# Patient Record
Sex: MALE | Race: WHITE
[De-identification: names, ages, dates, MRNs, and addresses within clinical notes are randomized per-mention and may not be internally consistent; named-entity substitution may affect disease eponyms.]

---

## 2019-11-20 ENCOUNTER — HOSPITAL ENCOUNTER (EMERGENCY)
Dept: HOSPITAL 8 - ED | Age: 34
Discharge: HOME | End: 2019-11-20
Payer: MEDICAID

## 2019-11-20 VITALS — SYSTOLIC BLOOD PRESSURE: 114 MMHG | DIASTOLIC BLOOD PRESSURE: 80 MMHG

## 2019-11-20 VITALS — HEIGHT: 74 IN | BODY MASS INDEX: 19.81 KG/M2 | WEIGHT: 154.32 LBS
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
## 2019-11-20 DIAGNOSIS — Z72.9: ICD-10-CM

## 2019-11-20 DIAGNOSIS — F10.220: Primary | ICD-10-CM

## 2019-11-20 DIAGNOSIS — Z59.0: ICD-10-CM

## 2019-11-20 PROCEDURE — 99283 EMERGENCY DEPT VISIT LOW MDM: CPT

## 2019-11-20 SDOH — ECONOMIC STABILITY - HOUSING INSECURITY: HOMELESSNESS: Z59.0

## 2019-11-20 NOTE — NUR
PT WALKING WITH STEADY GAIT. DISCHARGE PAPERS GIVEN. AMBULATED TO DISCHARGE 
WINDOW WITHOUT ASSISTANCE

## 2019-11-20 NOTE — NUR
PT AWAKE AND ASKING WHAT IS GOING ON. EXPLAINED TO HIM ONCE HE HAS SOBERED UP 
ENOUGH TO BE SAFE HE WILL BE DISCHARDED. ORDERED DINNER TRAY

## 2019-11-23 ENCOUNTER — HOSPITAL ENCOUNTER (EMERGENCY)
Dept: HOSPITAL 8 - ED | Age: 34
Discharge: HOME | End: 2019-11-23
Payer: MEDICAID

## 2019-11-23 VITALS — BODY MASS INDEX: 16.92 KG/M2 | WEIGHT: 143.3 LBS | HEIGHT: 77 IN

## 2019-11-23 VITALS — DIASTOLIC BLOOD PRESSURE: 88 MMHG | SYSTOLIC BLOOD PRESSURE: 142 MMHG
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
## 2019-11-23 DIAGNOSIS — F17.200: ICD-10-CM

## 2019-11-23 DIAGNOSIS — S01.511D: Primary | ICD-10-CM

## 2019-11-23 DIAGNOSIS — F12.20: ICD-10-CM

## 2019-11-23 DIAGNOSIS — Z72.9: ICD-10-CM

## 2019-11-23 PROCEDURE — 99283 EMERGENCY DEPT VISIT LOW MDM: CPT

## 2019-11-24 ENCOUNTER — HOSPITAL ENCOUNTER (EMERGENCY)
Dept: HOSPITAL 8 - ED | Age: 34
Discharge: HOME | End: 2019-11-24
Payer: MEDICAID

## 2019-11-24 VITALS — HEIGHT: 72 IN | BODY MASS INDEX: 20.31 KG/M2 | WEIGHT: 149.91 LBS

## 2019-11-24 VITALS — DIASTOLIC BLOOD PRESSURE: 89 MMHG | SYSTOLIC BLOOD PRESSURE: 132 MMHG

## 2019-11-24 DIAGNOSIS — F10.129: Primary | ICD-10-CM

## 2019-11-24 DIAGNOSIS — Z72.9: ICD-10-CM
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
## 2019-11-24 PROCEDURE — 99283 EMERGENCY DEPT VISIT LOW MDM: CPT

## 2019-11-24 NOTE — NUR
PT REFUSING TO LEAVE AFER BEING GIVEN DC PAPERWORK.  PT SCREAMING AND CURSING.  
SECURITY CALLED.  SECURITY AT BEDSIDE ASSISTING PATIENT OUT.

## 2020-05-03 ENCOUNTER — HOSPITAL ENCOUNTER (EMERGENCY)
Dept: HOSPITAL 8 - ED | Age: 35
Discharge: HOME | End: 2020-05-03
Payer: MEDICAID

## 2020-05-03 VITALS — HEIGHT: 77 IN | BODY MASS INDEX: 17.75 KG/M2 | WEIGHT: 150.36 LBS

## 2020-05-03 VITALS — SYSTOLIC BLOOD PRESSURE: 121 MMHG | DIASTOLIC BLOOD PRESSURE: 75 MMHG

## 2020-05-03 DIAGNOSIS — L03.116: Primary | ICD-10-CM

## 2020-05-03 DIAGNOSIS — F17.210: ICD-10-CM

## 2020-05-03 LAB
ALBUMIN SERPL-MCNC: 2.7 G/DL (ref 3.4–5)
ANION GAP SERPL CALC-SCNC: 7 MMOL/L (ref 5–15)
BASOPHILS # BLD AUTO: 0.02 X10^3/UL (ref 0–0.1)
BASOPHILS NFR BLD AUTO: 0 % (ref 0–1)
CALCIUM SERPL-MCNC: 8.5 MG/DL (ref 8.5–10.1)
CHLORIDE SERPL-SCNC: 101 MMOL/L (ref 98–107)
CREAT SERPL-MCNC: 0.55 MG/DL (ref 0.7–1.3)
EOSINOPHIL # BLD AUTO: 0.03 X10^3/UL (ref 0–0.4)
EOSINOPHIL NFR BLD AUTO: 1 % (ref 1–7)
ERYTHROCYTE [DISTWIDTH] IN BLOOD BY AUTOMATED COUNT: 14.8 % (ref 9.4–14.8)
LYMPHOCYTES # BLD AUTO: 1.65 X10^3/UL (ref 1–3.4)
LYMPHOCYTES NFR BLD AUTO: 26 % (ref 22–44)
MCH RBC QN AUTO: 30.6 PG (ref 27.5–34.5)
MCHC RBC AUTO-ENTMCNC: 33.3 G/DL (ref 33.2–36.2)
MCV RBC AUTO: 92.1 FL (ref 81–97)
MD: NO
MONOCYTES # BLD AUTO: 0.66 X10^3/UL (ref 0.2–0.8)
MONOCYTES NFR BLD AUTO: 10 % (ref 2–9)
NEUTROPHILS # BLD AUTO: 4.11 X10^3/UL (ref 1.8–6.8)
NEUTROPHILS NFR BLD AUTO: 64 % (ref 42–75)
PLATELET # BLD AUTO: 266 X10^3/UL (ref 130–400)
PMV BLD AUTO: 6.4 FL (ref 7.4–10.4)
RBC # BLD AUTO: 3.96 X10^6/UL (ref 4.38–5.82)

## 2020-05-03 PROCEDURE — 85025 COMPLETE CBC W/AUTO DIFF WBC: CPT

## 2020-05-03 PROCEDURE — 36415 COLL VENOUS BLD VENIPUNCTURE: CPT

## 2020-05-03 PROCEDURE — 99285 EMERGENCY DEPT VISIT HI MDM: CPT

## 2020-05-03 PROCEDURE — 80048 BASIC METABOLIC PNL TOTAL CA: CPT

## 2020-05-03 PROCEDURE — 82040 ASSAY OF SERUM ALBUMIN: CPT

## 2021-09-10 ENCOUNTER — HOSPITAL ENCOUNTER (EMERGENCY)
Dept: HOSPITAL 8 - ED | Age: 36
Discharge: LEFT BEFORE DISCHARGE | End: 2021-09-10
Payer: MEDICAID

## 2021-09-10 VITALS — WEIGHT: 176.37 LBS | BODY MASS INDEX: 23.37 KG/M2 | HEIGHT: 73 IN

## 2021-09-10 VITALS — SYSTOLIC BLOOD PRESSURE: 128 MMHG | DIASTOLIC BLOOD PRESSURE: 83 MMHG

## 2021-09-10 DIAGNOSIS — Z53.21: ICD-10-CM
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
## 2021-09-10 DIAGNOSIS — F10.129: Primary | ICD-10-CM

## 2021-09-10 NOTE — NUR
PT REFUSED LEAVE MASK ON AND IS BECOMING INCREASINGLY AGRESSIVE TO STAFF. 
SECURITY CALLED TO TALKED TO PT. PT REFUSED TO LISTEN AND WALKED OUT OF ER WITH 
A STEADY GAIT

## 2021-09-12 ENCOUNTER — HOSPITAL ENCOUNTER (EMERGENCY)
Dept: HOSPITAL 8 - ED | Age: 36
Discharge: LEFT BEFORE DISCHARGE | End: 2021-09-12
Payer: MEDICAID

## 2021-09-12 VITALS — BODY MASS INDEX: 17.52 KG/M2 | HEIGHT: 77 IN | WEIGHT: 148.37 LBS

## 2021-09-12 VITALS — SYSTOLIC BLOOD PRESSURE: 130 MMHG | DIASTOLIC BLOOD PRESSURE: 86 MMHG

## 2021-09-12 DIAGNOSIS — M25.561: Primary | ICD-10-CM

## 2021-09-12 DIAGNOSIS — Z53.21: ICD-10-CM
